# Patient Record
Sex: MALE | Race: WHITE | URBAN - METROPOLITAN AREA
[De-identification: names, ages, dates, MRNs, and addresses within clinical notes are randomized per-mention and may not be internally consistent; named-entity substitution may affect disease eponyms.]

---

## 2018-07-17 ENCOUNTER — APPOINTMENT (OUTPATIENT)
Dept: GENERAL RADIOLOGY | Facility: CLINIC | Age: 43
End: 2018-07-17
Attending: EMERGENCY MEDICINE
Payer: COMMERCIAL

## 2018-07-17 ENCOUNTER — HOSPITAL ENCOUNTER (EMERGENCY)
Facility: CLINIC | Age: 43
Discharge: HOME | End: 2018-07-17
Attending: EMERGENCY MEDICINE | Admitting: EMERGENCY MEDICINE
Payer: COMMERCIAL

## 2018-07-17 VITALS
TEMPERATURE: 98.7 F | HEIGHT: 71 IN | DIASTOLIC BLOOD PRESSURE: 90 MMHG | BODY MASS INDEX: 25.2 KG/M2 | RESPIRATION RATE: 16 BRPM | SYSTOLIC BLOOD PRESSURE: 136 MMHG | OXYGEN SATURATION: 98 % | WEIGHT: 180 LBS

## 2018-07-17 DIAGNOSIS — M25.512 ACUTE PAIN OF LEFT SHOULDER: ICD-10-CM

## 2018-07-17 DIAGNOSIS — R07.9 ACUTE CHEST PAIN: ICD-10-CM

## 2018-07-17 DIAGNOSIS — M62.830 BACK MUSCLE SPASM: ICD-10-CM

## 2018-07-17 LAB
ALBUMIN SERPL-MCNC: 3.6 G/DL (ref 3.4–5)
ALP SERPL-CCNC: 54 U/L (ref 40–150)
ALT SERPL W P-5'-P-CCNC: 26 U/L (ref 0–70)
ANION GAP SERPL CALCULATED.3IONS-SCNC: 7 MMOL/L (ref 3–14)
AST SERPL W P-5'-P-CCNC: 24 U/L (ref 0–45)
BASOPHILS # BLD AUTO: 0 10E9/L (ref 0–0.2)
BASOPHILS NFR BLD AUTO: 0.4 %
BILIRUB SERPL-MCNC: 0.6 MG/DL (ref 0.2–1.3)
BUN SERPL-MCNC: 11 MG/DL (ref 7–30)
CALCIUM SERPL-MCNC: 8.5 MG/DL (ref 8.5–10.1)
CHLORIDE SERPL-SCNC: 107 MMOL/L (ref 94–109)
CO2 SERPL-SCNC: 26 MMOL/L (ref 20–32)
CREAT SERPL-MCNC: 0.81 MG/DL (ref 0.66–1.25)
DIFFERENTIAL METHOD BLD: NORMAL
EOSINOPHIL # BLD AUTO: 0.3 10E9/L (ref 0–0.7)
EOSINOPHIL NFR BLD AUTO: 3.2 %
ERYTHROCYTE [DISTWIDTH] IN BLOOD BY AUTOMATED COUNT: 12.4 % (ref 10–15)
GFR SERPL CREATININE-BSD FRML MDRD: >90 ML/MIN/1.7M2
GLUCOSE SERPL-MCNC: 93 MG/DL (ref 70–99)
HCT VFR BLD AUTO: 40.4 % (ref 40–53)
HGB BLD-MCNC: 14 G/DL (ref 13.3–17.7)
IMM GRANULOCYTES # BLD: 0 10E9/L (ref 0–0.4)
IMM GRANULOCYTES NFR BLD: 0.4 %
INTERPRETATION ECG - MUSE: NORMAL
LYMPHOCYTES # BLD AUTO: 2.4 10E9/L (ref 0.8–5.3)
LYMPHOCYTES NFR BLD AUTO: 30.7 %
MCH RBC QN AUTO: 31.2 PG (ref 26.5–33)
MCHC RBC AUTO-ENTMCNC: 34.7 G/DL (ref 31.5–36.5)
MCV RBC AUTO: 90 FL (ref 78–100)
MONOCYTES # BLD AUTO: 0.7 10E9/L (ref 0–1.3)
MONOCYTES NFR BLD AUTO: 9.1 %
NEUTROPHILS # BLD AUTO: 4.4 10E9/L (ref 1.6–8.3)
NEUTROPHILS NFR BLD AUTO: 56.2 %
NRBC # BLD AUTO: 0 10*3/UL
NRBC BLD AUTO-RTO: 0 /100
PLATELET # BLD AUTO: 204 10E9/L (ref 150–450)
POTASSIUM SERPL-SCNC: 3.9 MMOL/L (ref 3.4–5.3)
PROT SERPL-MCNC: 6.9 G/DL (ref 6.8–8.8)
RBC # BLD AUTO: 4.49 10E12/L (ref 4.4–5.9)
SODIUM SERPL-SCNC: 140 MMOL/L (ref 133–144)
TROPONIN I SERPL-MCNC: <0.015 UG/L (ref 0–0.04)
WBC # BLD AUTO: 7.8 10E9/L (ref 4–11)

## 2018-07-17 PROCEDURE — 25000132 ZZH RX MED GY IP 250 OP 250 PS 637: Performed by: EMERGENCY MEDICINE

## 2018-07-17 PROCEDURE — 93005 ELECTROCARDIOGRAM TRACING: CPT

## 2018-07-17 PROCEDURE — 99285 EMERGENCY DEPT VISIT HI MDM: CPT | Mod: 25

## 2018-07-17 PROCEDURE — 80053 COMPREHEN METABOLIC PANEL: CPT | Performed by: EMERGENCY MEDICINE

## 2018-07-17 PROCEDURE — 71046 X-RAY EXAM CHEST 2 VIEWS: CPT

## 2018-07-17 PROCEDURE — 85025 COMPLETE CBC W/AUTO DIFF WBC: CPT | Performed by: EMERGENCY MEDICINE

## 2018-07-17 PROCEDURE — 84484 ASSAY OF TROPONIN QUANT: CPT | Performed by: EMERGENCY MEDICINE

## 2018-07-17 RX ORDER — CYCLOBENZAPRINE HCL 10 MG
10 TABLET ORAL 3 TIMES DAILY PRN
Qty: 15 TABLET | Refills: 0 | Status: SHIPPED | OUTPATIENT
Start: 2018-07-17

## 2018-07-17 RX ORDER — IBUPROFEN 600 MG/1
600 TABLET, FILM COATED ORAL EVERY 6 HOURS PRN
Qty: 30 TABLET | Refills: 0 | Status: SHIPPED | OUTPATIENT
Start: 2018-07-17

## 2018-07-17 RX ORDER — ALUMINA, MAGNESIA, AND SIMETHICONE 2400; 2400; 240 MG/30ML; MG/30ML; MG/30ML
15 SUSPENSION ORAL ONCE
Status: DISCONTINUED | OUTPATIENT
Start: 2018-07-17 | End: 2018-07-17 | Stop reason: HOSPADM

## 2018-07-17 RX ORDER — ASPIRIN 81 MG/1
162 TABLET, CHEWABLE ORAL ONCE
Status: COMPLETED | OUTPATIENT
Start: 2018-07-17 | End: 2018-07-17

## 2018-07-17 RX ADMIN — ASPIRIN 81 MG 162 MG: 81 TABLET ORAL at 01:19

## 2018-07-17 ASSESSMENT — ENCOUNTER SYMPTOMS
NUMBNESS: 0
NAUSEA: 0
LIGHT-HEADEDNESS: 0
WEAKNESS: 0
PALPITATIONS: 0
BACK PAIN: 1
DIZZINESS: 0

## 2018-07-17 NOTE — ED AVS SNAPSHOT
Emergency Department    [address] [facility] [address]    Phone:  [phone]    Fax:  [phone]                                       Santino Calvo   MRN: [id number]    Department:   Emergency Department   Date of Visit:  7/17/2018           Patient Information     Date Of Birth          1975        Your diagnoses for this visit were:     Acute pain of left shoulder     Acute chest pain     Back muscle spasm        You were seen by Wilmer Cárdenas MD.      Follow-up Information     Follow up with Cristo Dumont MD. Schedule an appointment as soon as possible for a visit in 2 days.    Specialty:  Family Practice    Contact information:    [address]  [facility] [address] [phone]          Follow up with  Emergency Department.    Specialty:  EMERGENCY MEDICINE    Why:  If symptoms worsen    Contact information:    [address] [facility] [id number] [phone]      Discharge References/Attachments     BACK SPASM, NO TRAUMA (ENGLISH)    CHEST PAIN, UNCERTAIN CAUSE (ENGLISH)    SHOULDER PAIN, UNCERTAIN CAUSE (ENGLISH)      24 Hour Appointment Hotline       To make an appointment at any [facility], call [phone] ([phone]). If you don't have a family doctor or clinic, we will help you find one. [facility] are conveniently located to serve the needs of you and your family.             Review of your medicines      START taking        Dose / Directions Last dose taken    cyclobenzaprine 10 MG tablet   Commonly known as:  FLEXERIL   Dose:  10 mg   Quantity:  15 tablet        Take 1 tablet (10 mg) by mouth 3 times daily as needed for muscle spasms   Refills:  0        ibuprofen 600 MG tablet   Commonly known as:  ADVIL/MOTRIN   Dose:  600 mg   Quantity:  30 tablet        Take 1 tablet (600 mg) by mouth every 6 hours as needed for moderate pain   Refills:  0                Prescriptions were sent or printed at these locations (2  Prescriptions)                   Other Prescriptions                Printed at Department/Unit printer (2 of 2)         cyclobenzaprine (FLEXERIL) 10 MG tablet               ibuprofen (ADVIL/MOTRIN) 600 MG tablet                Procedures and tests performed during your visit     CBC with platelets differential    Comprehensive metabolic panel    EKG 12-lead, tracing only    Troponin I    XR Chest 2 Views      Orders Needing Specimen Collection     None      Pending Results     No orders found from 7/15/2018 to 7/18/2018.            Pending Culture Results     No orders found from 7/15/2018 to 7/18/2018.            Pending Results Instructions     If you had any lab results that were not finalized at the time of your Discharge, you can call the ED Lab Result RN at [phone]. You will be contacted by this team for any positive Lab results or changes in treatment. The nurses are available 7 days a week from 10A to 6:30P.  You can leave a message 24 hours per day and they will return your call.        Test Results From Your Hospital Stay        7/17/2018  1:26 AM      Component Results     Component Value Ref Range & Units Status    WBC 7.8 4.0 - 11.0 10e9/L Final    RBC Count 4.49 4.4 - 5.9 10e12/L Final    Hemoglobin 14.0 13.3 - 17.7 g/dL Final    Hematocrit 40.4 40.0 - 53.0 % Final    MCV 90 78 - 100 fl Final    MCH 31.2 26.5 - 33.0 pg Final    MCHC 34.7 31.5 - 36.5 g/dL Final    RDW 12.4 10.0 - 15.0 % Final    Platelet Count 204 150 - 450 10e9/L Final    Diff Method Automated Method  Final    % Neutrophils 56.2 % Final    % Lymphocytes 30.7 % Final    % Monocytes 9.1 % Final    % Eosinophils 3.2 % Final    % Basophils 0.4 % Final    % Immature Granulocytes 0.4 % Final    Nucleated RBCs 0 0 /100 Final    Absolute Neutrophil 4.4 1.6 - 8.3 10e9/L Final    Absolute Lymphocytes 2.4 0.8 - 5.3 10e9/L Final    Absolute Monocytes 0.7 0.0 - 1.3 10e9/L Final    Absolute Eosinophils 0.3 0.0 - 0.7 10e9/L Final    Absolute  Basophils 0.0 0.0 - 0.2 10e9/L Final    Abs Immature Granulocytes 0.0 0 - 0.4 10e9/L Final    Absolute Nucleated RBC 0.0  Final         7/17/2018  1:44 AM      Component Results     Component Value Ref Range & Units Status    Troponin I ES <0.015 0.000 - 0.045 ug/L Final    The 99th percentile for upper reference range is 0.045 ug/L.  Troponin values   in the range of 0.045 - 0.120 ug/L may be associated with risks of adverse   clinical events.           7/17/2018  1:44 AM      Component Results     Component Value Ref Range & Units Status    Sodium 140 133 - 144 mmol/L Final    Potassium 3.9 3.4 - 5.3 mmol/L Final    Chloride 107 94 - 109 mmol/L Final    Carbon Dioxide 26 20 - 32 mmol/L Final    Anion Gap 7 3 - 14 mmol/L Final    Glucose 93 70 - 99 mg/dL Final    Urea Nitrogen 11 7 - 30 mg/dL Final    Creatinine 0.81 0.66 - 1.25 mg/dL Final    GFR Estimate >90 >60 mL/min/1.7m2 Final    Non  GFR Calc    GFR Estimate If Black >90 >60 mL/min/1.7m2 Final    African American GFR Calc    Calcium 8.5 8.5 - 10.1 mg/dL Final    Bilirubin Total 0.6 0.2 - 1.3 mg/dL Final    Albumin 3.6 3.4 - 5.0 g/dL Final    Protein Total 6.9 6.8 - 8.8 g/dL Final    Alkaline Phosphatase 54 40 - 150 U/L Final    ALT 26 0 - 70 U/L Final    AST 24 0 - 45 U/L Final         7/17/2018  1:54 AM      Narrative     XR CHEST 2 VW  7/17/2018 1:41 AM     INDICATION: Chest pain.    COMPARISON: None.        Impression     IMPRESSION: No infiltrates or other acute findings. Heart size is  within normal limits.    DEBBIE LAMBERT MD                Clinical Quality Measure: Blood Pressure Screening     Your blood pressure was checked while you were in the emergency department today. The last reading we obtained was  BP: 136/90 . Please read the guidelines below about what these numbers mean and what you should do about them.  If your systolic blood pressure (the top number) is less than 120 and your diastolic blood pressure (the bottom  "number) is less than 80, then your blood pressure is normal. There is nothing more that you need to do about it.  If your systolic blood pressure (the top number) is 120-139 or your diastolic blood pressure (the bottom number) is 80-89, your blood pressure may be higher than it should be. You should have your blood pressure rechecked within a year by a primary care provider.  If your systolic blood pressure (the top number) is 140 or greater or your diastolic blood pressure (the bottom number) is 90 or greater, you may have high blood pressure. High blood pressure is treatable, but if left untreated over time it can put you at risk for heart attack, stroke, or kidney failure. You should have your blood pressure rechecked by a primary care provider within the next 4 weeks.  If your provider in the emergency department today gave you specific instructions to follow-up with your doctor or provider even sooner than that, you should follow that instruction and not wait for up to 4 weeks for your follow-up visit.        Thank you for choosing [facility]       Thank you for choosing [facility] for your care. Our goal is always to provide you with excellent care. Hearing back from our patients is one way we can continue to improve our services. Please take a few minutes to complete the written survey that you may receive in the mail after you visit with us. Thank you!        [employer] Information     [employer] lets you send messages to your doctor, view your test results, renew your prescriptions, schedule appointments and more. To sign up, go to [email or web address] . Click on \"Log in\" on the left side of the screen, which will take you to the Welcome page. Then click on \"Sign up Now\" on the right side of the page.     You will be asked to enter the access code listed below, as well as some personal information. Please follow the directions to create your username and password.     Your access code is: [email or web address]  Expires: " 10/15/2018  2:56 AM     Your access code will  in 90 days. If you need help or a new code, please call your [facility] clinic or [phone].        Care EveryWhere ID     This is your Care EveryWhere ID. This could be used by other organizations to access your [facility] medical records  [id number]        Equal Access to Services     CHEPE ASCENCIO : Hadsaloni Pantoja, wadania grant, qaashlynta nicoleallawanda leon, cade serna . So [facility] [phone].    ATENCIÓN: Si habla español, tiene a arrington disposición servicios gratuitos de asistencia lingüística. Llame al [phone].    We comply with applicable federal civil rights laws and [place] laws. We do not discriminate on the basis of race, color, national origin, age, disability, sex, sexual orientation, or gender identity.            After Visit Summary       This is your record. Keep this with you and show to your community pharmacist(s) and doctor(s) at your next visit.

## 2018-07-17 NOTE — ED PROVIDER NOTES
History     Chief Complaint:  Chest Pain    HPI   Santino Calvo is a 43 year old male with a history of heart burn who presents to the emergency department today for evaluation of chest and shoulder pain.  3 days ago the patient had a heavy workout. Today while driving home from work a 1500 the patient started having a sharp burning pain in his left shoulder. This then seemed to dissipate over the next few hours, to the point of him forgetting about it. Then after going to bed the pain returned worse than before at 8/10 and it had radiated out to his left chest and left upper back. He took 800 mg ibuprofen, 1000 mg Tylenol, 40 mg Prilosec, and the first two doses of a Medrol Dosepak. This did not go away, and after applying heat it did not go away either. He decided that he should come in to the Emergency Department to be safe. Here, by 0030 his symptoms had reduced to a 4/10 without Emergency Department intervention. He denies palpitations, shortness of breath, nausea, light-headedness, dizziness, increased pain with exertion, numbness, and weakness.     Cardiac/PE/DVT Risk Factors:  History of hypertension - Negative  History of hyperlipidemia - Negative  History of diabetes - Negative  History of smoking - Negative  Personal history of PE/DVT - Negative  Family history of PE/DVT - Negative  Family history of heart complications - Positive    Allergies:  Penicillins    Medications:    Prilosec  Ibuprofen   Tylenol    Past Medical History:    History reviewed. No pertinent past medical history.    Past Surgical History:    History reviewed. No pertinent surgical history.    Family History:    History reviewed. No pertinent family history.    Social History:  The patient was accompanied to the ED by nobody.  Smoking Status: Never Smoker  Smokeless Tobacco: Never Used  Alcohol Use: Positive   Marital Status:      Review of Systems   Cardiovascular: Positive for chest pain (L). Negative for palpitations.  "  Gastrointestinal: Negative for nausea.   Musculoskeletal: Positive for back pain (L).        Shoulder pain (L)   Neurological: Negative for dizziness, weakness, light-headedness and numbness.   All other systems reviewed and are negative.    Physical Exam     Patient Vitals for the past 24 hrs:   BP Temp Temp src Heart Rate Resp SpO2 Height Weight   07/17/18 0226 136/90 - - - - 98 % - -   07/17/18 0118 (!) 134/93 - - - - 96 % - -   07/17/18 0020 (!) 150/104 98.7  F (37.1  C) Oral 61 16 98 % 1.803 m (5' 11\") 81.6 kg (180 lb)   07/17/18 0019 (!) 150/104 - - - - 97 % - -      Physical Exam  Constitutional:  Appears well-developed and well-nourished. Cooperative.   HENT:   Head:    Atraumatic.   Mouth/Throat:   Oropharynx is without erythema or exudate and mucous     membranes are moist.   Eyes:    Conjunctivae normal and EOM are normal.      Pupils are equal, round, and reactive to light.   Neck:    Normal range of motion. Neck supple.   Cardiovascular:  Normal rate, regular rhythm, normal heart sounds and radial and    dorsalis pedis pulses are 2+ and symmetric.  No murmurs, rub, gallops.  Pulmonary/Chest:  Effort normal and breath sounds normal.   Abdominal:   Soft. Bowel sounds are normal.      No splenomegaly or hepatomegaly. No tenderness. No rebound.   Musculoskeletal:  Normal range of motion. No edema. He is minimally tender along the medial aspect of his left upper scapula and in the perispinous muscles lateral to the left lower c-spine and upper t-spine extending out into the left trapezius. Minimal tenderness along the left upper sternal border. No calf tenderness.   Neurological:  Alert. Normal strength. No cranial nerve deficit. GCS 15.  Skin:    Skin is warm and dry. No skin changes.   Psychiatric:   Normal mood and affect.     Emergency Department Course     ECG:  ECG taken at 0026, ECG read at 0034  Normal sinus rhythm  Normal ECG  Rate 60 bpm. CA interval 172 ms. QRS duration 82 ms. QT/QTc 394/394 ms. " P-R-T axes 43 68 38.    Imaging:  Radiology findings were communicated with the patient who voiced understanding of the findings.    XR Chest 2 Views  No infiltrates or other acute findings. Heart size is  within normal limits.  Reading per radiology    Laboratory:  Laboratory findings were communicated with the patient who voiced understanding of the findings.    CBC: WBC 7.8, HGB 14.0,   CMP: Creatinine 0.81  Troponin I: <0.015    Interventions:  0119 Aspirin 162 mg PO    Emergency Department Course:    0020 Nursing notes and vitals reviewed.    0030 I performed an exam of the patient as documented above.     0115 IV was inserted and blood was drawn for laboratory testing, results above.     0137 The patient was sent for a XR while in the emergency department, results above.      0250 Patient was rechecked and updated. I personally reviewed the imaging and lab results with the patient and answered all related questions prior to discharge.    Impression & Plan      Medical Decision Making:   Santino Calvo is a 43 year old male who presents with chest pain.  The work up in the Emergency Department is negative.  The differential diagnosis of chest pain is broad and includes life threatening etiologies such as acute coronary syndrome, myocardial infarction, pulmonary embolism, acute aortic dissection, amongst others.  The patient's EKG was nonischemic and troponin was normal. With a heart score of 1, I recommended a delta troponin, but the patient declined this. The chest pain symptom complex would be atypical for coronary ischemia.  Chest xray reveals no evidence of pneumonia or pneumothorax.  No serious etiology for the chest pain were detected today during this visit.  I suspect this pain is musculoskeletal.  I can reproduce the pain somewhat by palpation over his left upper back and shoulder, and he is mildly tender along his left upper sternal border.  The patient also has a history of reflux, and pain  could have been precipitated by reflux or esophageal spasm.  Close follow up with primary care is indicated should the pain continue, as further work up may be performed; this was made clear to the patient, who understands.  He further agrees to return to the ED for recurrent pain, exertional symptoms, or other concerns.    HEART SCORE:    History:   Highly suspicious (2)          Moderately suspicious (1)         Slightly suspicious (0)     0    ECG:   Significant ST depression(1mm continguous)      Non-specific repolarization abnormality       Normal       0    Age:   > 65            45-65            < 45       0    Risk Factors:  3 or more           1-2       1     No risk factors          Troponin:   > 3x normal limit     1-3x normal limit     < normal limit      0    Total:           1    Risk factors:  Fam hx of CAD    A HEART score of < 3 places their risk of major adverse cardiac event at about 1.7% at 6 weeks (<1% at 30 days).  If repeated troponin at 3 hours likely reduces risk to less than 1%.      Diagnosis:    ICD-10-CM    1. Acute pain of left shoulder M25.512    2. Acute chest pain R07.9    3. Back muscle spasm M62.830      Disposition:   Discharge    Discharge Medications:  New Prescriptions    CYCLOBENZAPRINE (FLEXERIL) 10 MG TABLET    Take 1 tablet (10 mg) by mouth 3 times daily as needed for muscle spasms    IBUPROFEN (ADVIL/MOTRIN) 600 MG TABLET    Take 1 tablet (600 mg) by mouth every 6 hours as needed for moderate pain     Scribe Disclosure:  Rex MICHEL, am serving as a scribe at 12:38 AM on 7/17/2018 to document services personally performed by Wilmer Cárdenas MD based on my observations and the provider's statements to me.       EMERGENCY DEPARTMENT       Wilmer Cárdenas MD  07/18/18 0657

## 2018-07-17 NOTE — ED AVS SNAPSHOT
Emergency Department    [address] [facility] [address]    Phone:  [phone]    Fax:  [phone]                                       Santino Calvo   MRN: [id number]    Department:   Emergency Department   Date of Visit:  7/17/2018           After Visit Summary Signature Page     I have received my discharge instructions, and my questions have been answered. I have discussed any challenges I see with this plan with the nurse or doctor.    ..........................................................................................................................................  Patient/Patient Representative Signature      ..........................................................................................................................................  Patient Representative Print Name and Relationship to Patient    ..................................................               ................................................  Date                                            Time    ..........................................................................................................................................  Reviewed by Signature/Title    ...................................................              ..............................................  Date                                                            Time